# Patient Record
Sex: FEMALE
[De-identification: names, ages, dates, MRNs, and addresses within clinical notes are randomized per-mention and may not be internally consistent; named-entity substitution may affect disease eponyms.]

---

## 2019-01-28 ENCOUNTER — HOSPITAL ENCOUNTER (OUTPATIENT)
Dept: HOSPITAL 47 - RADMAMWWP | Age: 54
Discharge: HOME | End: 2019-01-28
Payer: COMMERCIAL

## 2019-01-28 DIAGNOSIS — N60.09: Primary | ICD-10-CM

## 2019-01-28 PROCEDURE — 77066 DX MAMMO INCL CAD BI: CPT

## 2019-01-29 NOTE — MM
Reason for exam: additional evaluation requested from prior study.

Last mammogram was performed 1 year and 3 months ago.



History:

Patient is postmenopausal.

Retro-pectoral saline implants in both breasts, 1995.



Physical Findings:

Nurse did not find any significant physical abnormalities on exam.



MG Diag Mamm Implants JOVANI w CAD

Bilateral CC, MLO, and ID view(s) were taken.

Prior study comparison: October 25, 2017, bilateral MG screening mammo 

implant/CAD.  July 21, 2015, mammogram, performed at [facility].

The breast tissue is heterogeneously dense. This may lower the sensitivity of 

mammography.  Stable benign calcifications. Bilateral implants are intact.

No significant new findings when compared with previous films.



These results were verbally communicated with the patient and result sheet given 

to the patient on 1/28/19.





ASSESSMENT: Benign, BI-RAD 2



RECOMMENDATION:

Follow-up diagnostic mammogram of both breasts in 1 year.

## 2021-02-25 ENCOUNTER — HOSPITAL ENCOUNTER (OUTPATIENT)
Dept: HOSPITAL 47 - RADMRIMAIN | Age: 56
Discharge: HOME | End: 2021-02-25
Attending: ORTHOPAEDIC SURGERY
Payer: COMMERCIAL

## 2021-02-25 DIAGNOSIS — M47.812: ICD-10-CM

## 2021-02-25 DIAGNOSIS — M50.81: ICD-10-CM

## 2021-02-25 DIAGNOSIS — M50.21: Primary | ICD-10-CM

## 2021-02-25 PROCEDURE — 72141 MRI NECK SPINE W/O DYE: CPT

## 2021-02-26 NOTE — MR
EXAMINATION TYPE: MR cervical spine wo con

 

DATE OF EXAM: 2/25/2021

 

COMPARISON: None

 

HISTORY: Neck pain into arms

 

Multiplanar multiecho imaging of the cervical spine was performed with no contrast.

 

 

 

Normal alignment. There is mild posterior disc herniation at C3-4 and C4-5. There is developmentally 
adequate spinal canal. Canal measures 9 mm at the narrowest point at C4-5. There is mild posterior di
sc bulging at C5-6 and C6-7. Cervical spinal cord has normal signal pattern. There is no edema. Disc 
herniation extends to the right side at C3-4 and is central aspect C4-5. There is some impingement on
 the neural foramen on the right side at C3-4 due to disc herniation.

 

I see no bony destructive process. Brainstem appears intact. There is no compression fracture. Poster
ior elements are intact.

 

IMPRESSION:

Mild multilevel spondylotic changes. Mild posterior disc herniations at C3-4 and C4-5 as above. No si
gnificant spinal stenosis. Right side C3-4 neural foraminal impingement.